# Patient Record
Sex: MALE | Race: WHITE | NOT HISPANIC OR LATINO | Employment: OTHER | URBAN - NONMETROPOLITAN AREA
[De-identification: names, ages, dates, MRNs, and addresses within clinical notes are randomized per-mention and may not be internally consistent; named-entity substitution may affect disease eponyms.]

---

## 2017-10-12 ENCOUNTER — HOSPITAL ENCOUNTER (EMERGENCY)
Facility: HOSPITAL | Age: 77
Discharge: HOME | End: 2017-10-12
Attending: EMERGENCY MEDICINE
Payer: MEDICARE

## 2017-10-12 ENCOUNTER — APPOINTMENT (EMERGENCY)
Dept: RADIOLOGY | Facility: HOSPITAL | Age: 77
End: 2017-10-12
Payer: MEDICARE

## 2017-10-12 VITALS
SYSTOLIC BLOOD PRESSURE: 112 MMHG | HEIGHT: 68 IN | WEIGHT: 226.85 LBS | BODY MASS INDEX: 34.38 KG/M2 | TEMPERATURE: 98.1 F | RESPIRATION RATE: 16 BRPM | OXYGEN SATURATION: 100 % | HEART RATE: 70 BPM | DIASTOLIC BLOOD PRESSURE: 70 MMHG

## 2017-10-12 DIAGNOSIS — L92.3 FOREIGN BODY REACTION OF THE SKIN: Primary | ICD-10-CM

## 2017-10-12 PROCEDURE — 99283 EMERGENCY DEPT VISIT LOW MDM: CPT

## 2017-10-12 PROCEDURE — 71020 HB CHEST X-RAY 2VW FRONTAL&LATL: CPT

## 2017-10-12 RX ORDER — AMLODIPINE BESYLATE 5 MG/1
5 TABLET ORAL EVERY EVENING
COMMUNITY

## 2017-10-12 RX ORDER — LISINOPRIL 20 MG/1
20 TABLET ORAL EVERY EVENING
COMMUNITY

## 2017-10-12 RX ORDER — TAMSULOSIN HYDROCHLORIDE 0.4 MG/1
0.4 CAPSULE ORAL
COMMUNITY

## 2017-10-12 NOTE — ED PROVIDER NOTES
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 who presents with a several month history of a fibrous band of tissue in the mid sternotomy incision region that began without any preceding trauma or injury and has been persistent since that time he first noticed it  He states the incision itself had healed well without any bridging tissue and this developed later  Denies any preceding trauma or injury to the area before the development of this fibrous tissue band  He has not tried to treat this area with anything for now  There is no bleeding/discharge from the area  Did mention this to his cardiologist when he visited last week and was referred to a cardiovascular surgeon for evaluation of this issue but declined travel there due to long distance and came to this emergency department instead  History provided by:  Patient      Prior to Admission Medications   Prescriptions Last Dose Informant Patient Reported? Taking? amLODIPine (NORVASC) 5 mg tablet 10/11/2017 at Unknown time  Yes Yes   Sig: Take 5 mg by mouth every evening   lisinopril (ZESTRIL) 20 mg tablet 10/11/2017 at Unknown time  Yes Yes   Sig: Take 20 mg by mouth every evening   tamsulosin (FLOMAX) 0 4 mg 10/11/2017 at Unknown time  Yes Yes   Sig: Take 0 4 mg by mouth daily with dinner      Facility-Administered Medications: None       Past Medical History:   Diagnosis Date    Cardiac disease     Enlarged prostate     Hyperlipidemia     Hypertension        Past Surgical History:   Procedure Laterality Date    CARDIAC SURGERY      CYST REMOVAL Left     breast       History reviewed  No pertinent family history    I have reviewed and agree with the history as documented  Social History   Substance Use Topics    Smoking status: Never Smoker    Smokeless tobacco: Former User     Types: Chew    Alcohol use Yes      Comment: rarely        Review of Systems   Constitutional: Negative for chills, diaphoresis, fatigue and fever  Respiratory: Negative for chest tightness and shortness of breath  Cardiovascular: Negative for chest pain and palpitations  Skin: Positive for wound  Negative for color change, pallor and rash  Hematological: Negative for adenopathy  Does not bruise/bleed easily  Physical Exam  ED Triage Vitals [10/12/17 1512]   Temperature Pulse Respirations Blood Pressure SpO2   98 1 °F (36 7 °C) 69 20 136/73 98 %      Temp Source Heart Rate Source Patient Position - Orthostatic VS BP Location FiO2 (%)   Temporal Monitor Sitting Right arm --      Pain Score       1           Physical Exam   Constitutional: He is oriented to person, place, and time  He appears well-developed and well-nourished  He is cooperative  No distress  HENT:   Head: Normocephalic and atraumatic  Right Ear: Hearing and external ear normal    Left Ear: Hearing and external ear normal    Nose: Nose normal    Eyes: Conjunctivae, EOM and lids are normal  Pupils are equal, round, and reactive to light  Neck: Trachea normal, normal range of motion and phonation normal  Neck supple  No JVD present  No tracheal tenderness, no spinous process tenderness and no muscular tenderness present  No tracheal deviation present  No thyroid mass and no thyromegaly present  Cardiovascular: Normal rate, regular rhythm, S1 normal, S2 normal and intact distal pulses  Exam reveals no gallop and no friction rub  Murmur heard  Crescendo systolic (Loudest at RUSB) murmur is present with a grade of 2/6   Pulses:       Radial pulses are 2+ on the right side, and 2+ on the left side  Dorsalis pedis pulses are 2+ on the right side, and 2+ on the left side  Posterior tibial pulses are 2+ on the right side, and 2+ on the left side  Pulmonary/Chest: Effort normal and breath sounds normal  No stridor  No respiratory distress  He has no decreased breath sounds  He has no wheezes  He has no rhonchi  He has no rales  He exhibits no tenderness, no edema, no deformity, no swelling and no retraction  Musculoskeletal: Normal range of motion  He exhibits no edema, tenderness or deformity  Lymphadenopathy:     He has no cervical adenopathy  Neurological: He is alert and oriented to person, place, and time  GCS eye subscore is 4  GCS verbal subscore is 5  GCS motor subscore is 6  Skin: Skin is warm, dry and intact  No rash noted  He is not diaphoretic  No erythema  Nursing note and vitals reviewed  ED Medications  Medications - No data to display    Diagnostic Studies  Labs Reviewed - No data to display    XR chest 2 views   Final Result      No active pulmonary disease           Workstation performed: [id number]             Procedures  Procedures      Phone Contacts  ED Phone Contact    ED Course  ED Course          MDM  Number of Diagnoses or Management Options  Foreign body reaction of the skin due to sternotomy wire: new and requires workup     Amount and/or Complexity of Data Reviewed  Tests in the radiology section of CPT®: ordered and reviewed  Decide to obtain previous medical records or to obtain history from someone other than the patient: yes  Review and summarize past medical records: yes  Independent visualization of images, tracings, or specimens: yes    Risk of Complications, Morbidity, and/or Mortality  Presenting problems: moderate  Diagnostic procedures: moderate  Management options: low  General comments: CXR appears to demonstrate small malpositioning of one end of third sternotomy wire from top of sternum possibly causing a foreign body reaction with scar tissue formation that accounts for the current appearance of patient's sternotomy incision  Regardless of exact etiology, there are no signs/symptoms of acute wound infection that would warrant antibiotic therapy at this time  There is no evidence of wound dehiscence or drainage from the wound either  Recommended patient to follow with his cardiac surgeon for a discussion regarding benefits of excision of affected scar tissue/sternotomy wire revision  All questions answered prior to discharge  The patient expressed understanding and agreed to plan  Patient Progress  Patient progress: stable    CritCare Time    Disposition  Final diagnoses:   Foreign body reaction of the skin due to sternotomy wire     ED Disposition     ED Disposition Condition Comment    Discharge  [address] discharge to home/self care  Condition at discharge: Good        Follow-up Information     Follow up With Specialties Details Why Contact Info    Dr Marc Carroll  Call in 1 day For an appointment for further evaluation [address], [place], [address] [address]  [phone]        Discharge Medication List as of 10/12/2017  4:24 PM      CONTINUE these medications which have NOT CHANGED    Details   amLODIPine (NORVASC) 5 mg tablet Take 5 mg by mouth every evening, Historical Med      lisinopril (ZESTRIL) 20 mg tablet Take 20 mg by mouth every evening, Historical Med      tamsulosin (FLOMAX) 0 4 mg Take 0 4 mg by mouth daily with dinner, Historical Med           No discharge procedures on file      ED Provider  Electronically Signed by       Josy Dickerson DO  10/13/17 1931

## 2017-10-12 NOTE — DISCHARGE INSTRUCTIONS
Soft Tissue Foreign Body   WHAT YOU NEED TO KNOW:   A foreign body may dissolve or come out of your skin without treatment  It may take weeks or months for this to happen  Your skin may feel stretched and sore after the foreign body is removed  This is normal and should get better within a few days  DISCHARGE INSTRUCTIONS:   Return to the emergency department if:   · Blood soaks through your bandage  · Your stitches come apart  · You see red streaks on the skin near your wound  · You have bleeding that does not stop after 10 minutes of holding firm, direct pressure over the wound  Contact your healthcare provider if:   · You have a fever  · Your wound is red, swollen, and draining pus  · Your symptoms, such as pain, do not get better or get worse  · You have questions or concerns about your condition or care  Medicines: You may  need any of the following:  · Antibiotics  help prevent a bacterial infection  · Acetaminophen  decreases pain and fever  It is available without a doctor's order  Ask how much to take and how often to take it  Follow directions  Acetaminophen can cause liver damage if not taken correctly  · NSAIDs , such as ibuprofen, help decrease swelling, pain, and fever  This medicine is available with or without a doctor's order  NSAIDs can cause stomach bleeding or kidney problems in certain people  If you take blood thinner medicine, always ask your healthcare provider if NSAIDs are safe for you  Always read the medicine label and follow directions  · Prescription pain medicine  may be given  Ask your healthcare provider how to take this medicine safely  Some prescription pain medicines contain acetaminophen  Do not take other medicines that contain acetaminophen without talking to your healthcare provider  Too much acetaminophen may cause liver damage  Prescription pain medicine may cause constipation   Ask your healthcare provider how to prevent or treat constipation  · Take your medicine as directed  Contact your healthcare provider if you think your medicine is not helping or if you have side effects  Tell him of her if you are allergic to any medicine  Keep a list of the medicines, vitamins, and herbs you take  Include the amounts, and when and why you take them  Bring the list or the pill bottles to follow-up visits  Carry your medicine list with you in case of an emergency  Elevate  the injured area above the level of your heart as often as you can  This will help decrease swelling and pain  Prop the injured area on pillows or blankets to keep it elevated comfortably  Apply ice  on your wound for 15 to 20 minutes every hour or as directed  Use an ice pack, or put crushed ice in a plastic bag  Cover it with a towel before you apply it to your skin  Ice helps prevent tissue damage and decreases swelling and pain  Care for your wound as directed: Your skin may feel stretched and sore after the foreign body is removed  This is normal and should get better within a few days  Keep your wound clean and dry  Do not get your wound wet  Do not change the bandage for 48 hours or as directed  You can change your bandages before 48 hours if they get wet or dirty  If your wound is packed, remove and change the packing as directed  Cover the area with a bandage as directed  Apply firm, steady pressure for 5 to 10 minutes if your wound bleeds  Use a clean gauze or towel to apply pressure  Bathing:  Ask your healthcare provider when your wound can get wet  When he says it is okay, carefully wash around the wound with soap and water  Let soap and water run over your wound  Do not scrub your wound  Dry the area and put on new, clean bandages as directed  Follow up with your healthcare provider as directed: You may need to return in 48 hours to have your wound checked for infection   You may need x-ray, ultrasound, or CT pictures to make sure all of the foreign body has been removed  Write down your questions so you remember to ask them during your visits  © 2017 [address] Information is for End User's use only and may not be sold, redistributed or otherwise used for commercial purposes  All illustrations and images included in CareNotes® are the copyrighted property of [employer]  or Anand [employer]  The above information is an  only  It is not intended as medical advice for individual conditions or treatments  Talk to your doctor, nurse or pharmacist before following any medical regimen to see if it is safe and effective for you

## 2017-11-29 ENCOUNTER — HOSPITAL ENCOUNTER (EMERGENCY)
Facility: HOSPITAL | Age: 77
Discharge: HOME | End: 2017-11-29
Attending: EMERGENCY MEDICINE | Admitting: EMERGENCY MEDICINE
Payer: MEDICARE

## 2017-11-29 ENCOUNTER — APPOINTMENT (EMERGENCY)
Dept: RADIOLOGY | Facility: HOSPITAL | Age: 77
End: 2017-11-29
Payer: MEDICARE

## 2017-11-29 VITALS
BODY MASS INDEX: 35.23 KG/M2 | HEART RATE: 70 BPM | RESPIRATION RATE: 17 BRPM | DIASTOLIC BLOOD PRESSURE: 73 MMHG | TEMPERATURE: 98.9 F | SYSTOLIC BLOOD PRESSURE: 133 MMHG | WEIGHT: 231.7 LBS | OXYGEN SATURATION: 97 %

## 2017-11-29 DIAGNOSIS — S43.402A SPRAIN OF LEFT SHOULDER: Primary | ICD-10-CM

## 2017-11-29 DIAGNOSIS — M75.22 BICIPITAL TENDONITIS OF LEFT SHOULDER: ICD-10-CM

## 2017-11-29 PROCEDURE — 99283 EMERGENCY DEPT VISIT LOW MDM: CPT

## 2017-11-29 PROCEDURE — 73030 X-RAY EXAM OF SHOULDER: CPT

## 2017-11-29 RX ORDER — NAPROXEN 500 MG/1
500 TABLET ORAL 2 TIMES DAILY WITH MEALS
Qty: 14 TABLET | Refills: 0 | Status: SHIPPED | OUTPATIENT
Start: 2017-11-29 | End: 2017-12-06

## 2017-11-29 RX ORDER — NAPROXEN 250 MG/1
500 TABLET ORAL ONCE
Status: COMPLETED | OUTPATIENT
Start: 2017-11-29 | End: 2017-11-29

## 2017-11-29 RX ADMIN — NAPROXEN 500 MG: 250 TABLET ORAL at 14:54

## 2017-11-29 NOTE — DISCHARGE INSTRUCTIONS
Shoulder Sprain   WHAT YOU NEED TO KNOW:   A shoulder sprain happens when a ligament in your shoulder is stretched or torn  Ligaments are the tough tissues that connect bones  Ligaments allow you to lift, lower, and rotate your arm  DISCHARGE INSTRUCTIONS:   Return to the emergency department if:   · You are short of breath  · Your throat feels tight, or you have trouble swallowing  · You feel sudden, sharp chest pain on the same side as your injury  · Your skin feels cold or clammy  Contact your healthcare provider if:   · The skin on your injured shoulder looks blue or pale  · You have new or increased swelling and pain in your shoulder  · You have new or increased stiffness when you move your injured shoulder  · You have questions or concerns about your condition or care  Medicines:   · Prescription pain medicine  may be given  Ask how to take this medicine safely  · Take your medicine as directed  Contact your healthcare provider if you think your medicine is not helping or if you have side effects  Tell him or her if you are allergic to any medicine  Keep a list of the medicines, vitamins, and herbs you take  Include the amounts, and when and why you take them  Bring the list or the pill bottles to follow-up visits  Carry your medicine list with you in case of an emergency  Follow up with your healthcare provider as directed:  Write down your questions so you remember to ask them during your visits  Self-care:   · Rest  your shoulder so it can heal  Avoid moving your shoulder as your injury heals  This will help decrease the risk of more damage to your shoulder  · Apply ice  on your shoulder for 15 to 20 minutes every hour or as directed  Use an ice pack, or put crushed ice in a plastic bag  Cover it with a towel  Ice helps prevent tissue damage and decreases swelling and pain  · Compress your shoulder as directed   Compression provides support and helps decrease swelling and movement so your shoulder can heal  For mild sprains, you may be given a sling to support your arm  You may need a padded brace or a plaster cast to hold your shoulder in place if the sprain is more serious  How to wear a brace, sling, or splint:  A brace, sling, or splint may be needed to limit your movement and protect your injured shoulder  · Wear your brace, sling, or splint as directed  You may need to wear it all the time and take it off only to bathe or do exercises  Ask your healthcare provider how long you should wear it  · Keep your skin clean and dry  Padding under your armpit will help absorb sweat and prevent sores on your skin  · Do not hunch your shoulders  This may cause pain  Keep your shoulders relaxed  · Position the sling over your arm and hand so that it also covers your knuckles  This will help the sling support your wrist and hand  Position your wrist higher than your elbow  Your wrist may start to hurt or go numb if your sling is too short  Exercise your shoulder:  After you rest your shoulder for 3 to 7 days, you will need to do light exercises to decrease shoulder stiffness  Check with your healthcare provider before you return to your normal activities or sports  Prevent another injury:  You can hurt your shoulder again if you stop treatment too soon  The following may decrease your risk for sprains:  · Do not exercise when you are tired or in pain  Warm up and stretch before you exercise  · Wear equipment to protect yourself when you play sports  · Wear shoes that fit well and run on flat surfaces to prevent falls  © 2017 [address] Information is for End User's use only and may not be sold, redistributed or otherwise used for commercial purposes  All illustrations and images included in CareNotes® are the copyrighted property of [employer]  or Anand Lopez  The above information is an  only   It is not intended as medical advice for individual conditions or treatments  Talk to your doctor, nurse or pharmacist before following any medical regimen to see if it is safe and effective for you  Tendinitis   WHAT YOU NEED TO KNOW:   Tendinitis is painful inflammation or breakdown of your tendons  It may also be called tendinopathy  Tendinitis often occurs in the knee, shoulder, ankle, hip, or elbow  DISCHARGE INSTRUCTIONS:   Medicines:   · Pain medicines  such as acetaminophen or NSAIDs may decrease swelling and pain or fever  These medicines are available without a doctor's order  Ask which medicine to take  Ask how much to take and when to take it  Follow directions  Acetaminophen and NSAIDs can cause liver or kidney damage if not taken correctly  If you take blood thinner medicine, always ask your healthcare provider if NSAIDs are safe for you  Always read the medicine label and follow the directions on it before using these medicine  · Take your medicine as directed  Contact your healthcare provider if you think your medicine is not helping or if you have side effects  Tell him if you are allergic to any medicine  Keep a list of the medicines, vitamins, and herbs you take  Include the amounts, and when and why you take them  Bring the list or the pill bottles to follow-up visits  Carry your medicine list with you in case of an emergency  Management:   · Rest  your tendon as directed to help it heal  Ask your healthcare provider if you need to stop putting weight on your affected area  · Ice  helps decrease swelling and pain  Ice may also help prevent tissue damage  Use an ice pack, or put crushed ice in a plastic bag  Cover it with a towel and place it on the affected area for 10 to 15 minutes every hour or as directed  · Support devices  such as a cane, splint, shoe insert, or brace may help reduce your pain  · Physical therapy  may be ordered by your healthcare provider   This may be used to teach you exercises to help improve movement and strength, and to decrease pain  You may also learn how to improve your posture, and how to lift or exercise correctly  Prevention:   · Stretch and warm up  before you exercise  · Exercise regularly  to strengthen the muscles around your joint  Ease into an exercise routine for the first 3 weeks to prevent another injury  Ask your healthcare provider about the best exercise plan for you  Rest fully between activities  · Use the right equipment  for sports and exercise  Wear braces or tape around weak joints as directed  Follow up with your healthcare provider as directed:  Write down your questions so you remember to ask them during your visits  Contact your healthcare provider if:   · You have increased pain even after you take medicine  · You have questions or concerns about your condition or care  Return to the emergency department if:   · You have increased redness over the joint, or swelling in the joint  · You suddenly cannot move your joint  © 2017 [address] Information is for End User's use only and may not be sold, redistributed or otherwise used for commercial purposes  All illustrations and images included in CareNotes® are the copyrighted property of [employer]  or Anand Lopez  The above information is an  only  It is not intended as medical advice for individual conditions or treatments  Talk to your doctor, nurse or pharmacist before following any medical regimen to see if it is safe and effective for you

## 2017-11-30 NOTE — ED PROVIDER NOTES
History  Chief Complaint   Patient presents with    Shoulder Injury     Outside brushing his dog, a [employer] truck drove by and dog went to go after the truck, the dog yanked left shoulder, has had pain since  Occurred yesterday     HPI     65 yoM presents with left shoulder injury  He was brushing his dog, holding the leash  Dog became startled and yanked his left shoulder  Having aching pain to the left anterior shoulder with some radiations up the neck and down the upper arm  No limited ROM except due to pain, especially with flexion and abduction  No paresthesias or weakness  No fall down or other injuries  Is right handed  MDM:  Imp: shoulder sprain vs dislocation or fx  X-rays, nsaids, ortho f/u  Prior to Admission Medications   Prescriptions Last Dose Informant Patient Reported? Taking? amLODIPine (NORVASC) 5 mg tablet   Yes No   Sig: Take 5 mg by mouth every evening   lisinopril (ZESTRIL) 20 mg tablet   Yes No   Sig: Take 20 mg by mouth every evening   tamsulosin (FLOMAX) 0 4 mg   Yes No   Sig: Take 0 4 mg by mouth daily with dinner      Facility-Administered Medications: None       Past Medical History:   Diagnosis Date    Cardiac disease     Enlarged prostate     Hyperlipidemia     Hypertension        Past Surgical History:   Procedure Laterality Date    CARDIAC PACEMAKER PLACEMENT      CARDIAC SURGERY      CABG x 5    CYST REMOVAL Left     breast       History reviewed  No pertinent family history  I have reviewed and agree with the history as documented  Social History   Substance Use Topics    Smoking status: Never Smoker    Smokeless tobacco: Former User     Types: Chew    Alcohol use Yes      Comment: rarely        Review of Systems   Constitutional: Negative  Negative for appetite change, diaphoresis, fatigue and fever     HENT: Negative for congestion, dental problem, drooling, ear discharge, ear pain, postnasal drip, rhinorrhea, sore throat, trouble swallowing and voice change  Eyes: Negative for photophobia, pain, discharge, redness and visual disturbance  Respiratory: Negative for cough, choking, chest tightness, shortness of breath, wheezing and stridor  Cardiovascular: Negative for chest pain, palpitations and leg swelling  Gastrointestinal: Negative for abdominal pain, blood in stool, constipation, diarrhea, nausea and vomiting  Endocrine: Negative  Genitourinary: Negative  Musculoskeletal:        Left shoulder pain   Skin: Negative  Allergic/Immunologic: Negative  Neurological: Negative  Hematological: Negative  Psychiatric/Behavioral: Negative  Physical Exam  ED Triage Vitals [11/29/17 1344]   Temperature Pulse Respirations Blood Pressure SpO2   98 9 °F (37 2 °C) 70 17 138/67 96 %      Temp Source Heart Rate Source Patient Position - Orthostatic VS BP Location FiO2 (%)   Temporal Monitor Sitting Right arm --      Pain Score       7           Orthostatic Vital Signs  Vitals:    11/29/17 1344 11/29/17 1400   BP: 138/67 133/73   Pulse: 70 70   Patient Position - Orthostatic VS: Sitting        Physical Exam   Constitutional: He is oriented to person, place, and time  He appears well-developed and well-nourished  No distress  HENT:   Head: Normocephalic and atraumatic  Nose: Nose normal    Mouth/Throat: Oropharynx is clear and moist    Eyes: Conjunctivae and EOM are normal  Pupils are equal, round, and reactive to light  Neck: Normal range of motion  Neck supple  No thyromegaly present  Cardiovascular: Normal rate, regular rhythm, normal heart sounds and intact distal pulses  Exam reveals no gallop and no friction rub  No murmur heard  Pulmonary/Chest: Effort normal and breath sounds normal  No stridor  No respiratory distress  He has no wheezes  He has no rales  He exhibits no tenderness  Abdominal: Soft  Bowel sounds are normal  There is no tenderness  There is no guarding  Musculoskeletal: Normal range of motion   He exhibits tenderness (left bicipital tendon discomfort to palp  Normal strength in all plains, but pain with active ROM in abduction  )  He exhibits no deformity  Neurological: He is alert and oriented to person, place, and time  He exhibits normal muscle tone  Skin: Skin is warm and dry  Psychiatric: He has a normal mood and affect  Vitals reviewed  ED Medications  Medications   naproxen (NAPROSYN) tablet 500 mg (500 mg Oral Given 11/29/17 1454)       Diagnostic Studies  Results Reviewed     None                 XR shoulder 2+ views LEFT   ED Interpretation by Case EDDIE Delgado DO (11/29 5219)   No acute osseus abnormality  Final Result by ROBERTO Ayala MD (11/29 6695)      No acute osseous abnormality  Cardiac pacemaker and stent  Workstation performed: [id number]                    Procedures  Procedures       Phone Contacts  ED Phone Contact    ED Course  ED Course                                MDM  CritCare Time    Disposition  Final diagnoses:   Sprain of left shoulder   Bicipital tendonitis of left shoulder     Time reflects when diagnosis was documented in both MDM as applicable and the Disposition within this note     Time User Action Codes Description Comment    11/29/2017  2:42 PM Danny, Case Add [A42 275P] Sprain of left shoulder     11/29/2017  2:42 PM Danny, Case Add [M75 [id number]] Bicipital tendonitis of left shoulder       ED Disposition     ED Disposition Condition Comment    Discharge  [address] discharge to home/self care  Condition at discharge: Good        Follow-up Information     Follow up With Specialties Details Why Contact Marciano Witt MD Orthopedic Surgery Schedule an appointment as soon as possible for a visit in 5 days for left shoulder sprain [phone] [place] [phone]          Discharge Medication List as of 11/29/2017  2:49 PM      START taking these medications    Details   naproxen (NAPROSYN) 500 mg tablet Take 1 tablet by mouth 2 (two) times a day with meals for 7 days, Starting Wed 11/29/2017, Until Wed 12/6/2017, Print         CONTINUE these medications which have NOT CHANGED    Details   amLODIPine (NORVASC) 5 mg tablet Take 5 mg by mouth every evening, Historical Med      lisinopril (ZESTRIL) 20 mg tablet Take 20 mg by mouth every evening, Historical Med      tamsulosin (FLOMAX) 0 4 mg Take 0 4 mg by mouth daily with dinner, Historical Med           No discharge procedures on file      ED Provider  Electronically Signed by           Jose F Fierro DO  11/30/17 7411

## 2017-12-05 ENCOUNTER — ALLSCRIPTS OFFICE VISIT (OUTPATIENT)
Dept: OTHER | Facility: OTHER | Age: 77
End: 2017-12-05

## 2017-12-05 ENCOUNTER — TRANSCRIBE ORDERS (OUTPATIENT)
Dept: ADMINISTRATIVE | Facility: HOSPITAL | Age: 77
End: 2017-12-05

## 2017-12-05 DIAGNOSIS — M75.42 IMPINGEMENT SYNDROME OF LEFT SHOULDER: ICD-10-CM

## 2017-12-05 DIAGNOSIS — M75.122 COMPLETE ROTATOR CUFF TEAR OF LEFT SHOULDER: ICD-10-CM

## 2017-12-05 DIAGNOSIS — M75.42 IMPINGEMENT SYNDROME OF LEFT SHOULDER: Primary | ICD-10-CM

## 2017-12-06 NOTE — PROGRESS NOTES
Assessment    1  Strain of tendon of left rotator cuff, initial encounter (840 4) (S46 012A)   2  Impingement syndrome of shoulder, left (726 2) (W12 42)    Plan  Impingement syndrome of shoulder, left    · Betamethasone Sod Phos & Acet 6 (3-3) MG/ML Injection Suspension   · *1 - [facility]  *  Status: Active  Requested for: 45WHK2411  Care Summary provided  : Yes   · Follow-up visit in 2 months Evaluation and Treatment  Follow-up  Status: Hold For -Scheduling  Requested for: 85ZNZ0282   · * CT SHOULDER LEFT ARTHROGRAM; Status:Need Information - FinancialAuthorization; Requested for:[id number];     Discussion/Summary    Left shoulder pain due to impingement and possible rotator cuff tear  Discussed treatment with the patient  Patient cannot get an MRI secondary to his pacemaker  Left subacromial space injected with steroid  Left biceps tendon up also injected with steroid  Patient will start a physical therapy program  Follow up in 6 weeks  If Patient is no better we will consider schedule him for an arthroscopy of the shoulder  Chief Complaint    1  Shoulder Problem  Left shoulder pain      History of Present Illness  69-year-old retired gentleman  Patient is right handed  Patient retired as an  and   Patient comes in with left shoulder pain for the last 2 weeks duration  initially injured his left shoulder while snow shoveling last winter  The pain and weakness disappear of its own accord  Two weeks ago was lifting a 40 pound box when he felt something give in his shoulder  He further re-injured her shoulder when his dog and dragged him while he is taking it out for a walk  Patient complains of pain in the left shoulder  Aggravated by overhead activities  Decreased with rest  Patient was seen and treated in the emergency room  Pain is present in the posterior lateral and anterior aspect of the shoulder  He has a sense of weakness in the shoulder   Patient has had bypass surgery with pacemaker in the past and patient is on regular Coumadin      Review of Systems   Constitutional: No fever or chills, feels well, no tiredness, no recent weight loss or weight gain  Eyes: No complaints of red eyes, no eyesight problems  ENT: no complaints of loss of hearing, no nosebleeds, no sore throat  Cardiovascular: No complaints of chest pain, no palpitations, no leg claudication or lower extremity edema  Respiratory: No complaints of shortness of breath, no wheezing, no cough  Gastrointestinal: No complaints of abdominal pain, no constipation, no nausea or vomiting, no diarrhea or bloody stools  Genitourinary: No complaints of dysuria or incontinence, no hesitancy, no nocturia  Musculoskeletal: as noted in HPI  Integumentary: No complaints of skin rash or lesion, no itching or dry skin, no skin wounds  Neurological: No complaints of headache, no confusion, no numbness or tingling, no dizziness  Psychiatric: No suicidal thoughts, no anxiety, no depression  Endocrine: frequent urination  Active Problems  1  Arthritis (716 90) (M19 90)   2  Heart disorder (429 9) (I51 9)   3  Hypertension (401 9) (I10)    Surgical History   · History of Heart Surgery   · History of Pacemaker Placement    Family History  Family History    · Paternal history of Black lung (500) (J60)   · Family history of Heart disease (429 9) (I51 9)   · Maternal history of Heart disease (429 9) (I51 9)    Current Meds   1  Lisinopril 20 MG Oral Tablet; TAKE 1 TABLET BY MOUTH ONCE DAILY Recorded   2  Norvasc 5 MG Oral Tablet (AmLODIPine Besylate); Take 1 tablet daily Recorded   3  Pravastatin Sodium 10 MG Oral Tablet; TAKE 1 TABLET AT BEDTIME Recorded   4  Tamsulosin HCl - 0 4 MG Oral Capsule Recorded    Allergies  1   No Known Drug Allergies    Vitals   Recorded: [id number] 02:11PM Recorded: 06HXI5023 01:59PM   Heart Rate 70    Systolic 479    Diastolic 81    Weight  985 lb        Physical Exam    ROM: Full except as noted: Forward flexion: restricted AROM 80 degrees  Abduction: restricted AROM 90 degrees  Motor: Normal except as noted: 3/5 external rotation,-- 4/5 forward flexion,-- 4/5 abduction,-- 5/5 extension-- and-- 5/5 internal rotation  Special Tests: Negative except positive Painful Arc  Left Shoulder Appearance[de-identified] Normal except  Tenderness: None except the greater tuberosity  Constitutional - General appearance: Normal   Musculoskeletal - Gait and station: Normal -- Digits and nails: Normal -- Muscle strength/tone: Normal   Cardiovascular - Pulses: Normal -- Examination of extremities for edema and/or varicosities: Normal   Skin - Skin and subcutaneous tissue: Normal   Neurologic - Sensation: Normal   Psychiatric - Orientation to person, place, and time: Normal -- Mood and affect: Normal   Eyes  Conjunctiva and lids: Normal    Pupils and irises: Normal        Procedure    Procedure: Injection of the left subacromial bursa  Indication:  inflammation-- and-- diagnostic  Risk, benefits and alternatives were discussed with the patient  Verbal consent was obtained prior to the procedure  Betadine was used to prep the area  ethyl chloride spray was used as a topical anesthetic  A 22-gauge was used to inject 4cc of 1% Lidocaine-- and-- 1mL of 4 mg/mL dexamethasone  A bandage was applied  the patient tolerated the procedure well  Complications: none        Signatures   Electronically signed by : CATALINA Talavera ; Dec  5 2017  2:28PM EST                       (Author)

## 2017-12-19 ENCOUNTER — GENERIC CONVERSION - ENCOUNTER (OUTPATIENT)
Dept: OTHER | Facility: OTHER | Age: 77
End: 2017-12-19

## 2017-12-19 ENCOUNTER — HOSPITAL ENCOUNTER (OUTPATIENT)
Dept: CT IMAGING | Facility: HOSPITAL | Age: 77
Discharge: HOME | End: 2017-12-19
Attending: ORTHOPAEDIC SURGERY
Payer: MEDICARE

## 2017-12-19 ENCOUNTER — HOSPITAL ENCOUNTER (OUTPATIENT)
Dept: RADIOLOGY | Facility: HOSPITAL | Age: 77
Discharge: HOME | End: 2017-12-19
Attending: ORTHOPAEDIC SURGERY
Payer: MEDICARE

## 2017-12-19 DIAGNOSIS — M75.42 IMPINGEMENT SYNDROME OF LEFT SHOULDER: ICD-10-CM

## 2017-12-19 PROCEDURE — 23350 INJECTION FOR SHOULDER X-RAY: CPT

## 2017-12-19 PROCEDURE — 73201 CT UPPER EXTREMITY W/DYE: CPT

## 2017-12-19 PROCEDURE — 77002 NEEDLE LOCALIZATION BY XRAY: CPT

## 2017-12-19 RX ORDER — LIDOCAINE WITH 8.4% SOD BICARB 0.9%(10ML)
2 SYRINGE (ML) INJECTION ONCE
Status: COMPLETED | OUTPATIENT
Start: 2017-12-19 | End: 2017-12-19

## 2017-12-19 RX ADMIN — IOHEXOL 14.5 ML: 240 INJECTION, SOLUTION INTRATHECAL; INTRAVASCULAR; INTRAVENOUS; ORAL at 10:25

## 2017-12-19 RX ADMIN — LIDOCAINE HYDROCHLORIDE 2 ML: 10 INJECTION, SOLUTION INFILTRATION; PERINEURAL at 10:25

## 2017-12-29 ENCOUNTER — GENERIC CONVERSION - ENCOUNTER (OUTPATIENT)
Dept: OTHER | Facility: OTHER | Age: 77
End: 2017-12-29

## 2018-01-23 VITALS
BODY MASS INDEX: 33.33 KG/M2 | DIASTOLIC BLOOD PRESSURE: 81 MMHG | HEIGHT: 69 IN | SYSTOLIC BLOOD PRESSURE: 159 MMHG | HEART RATE: 70 BPM | WEIGHT: 225 LBS

## 2018-01-24 VITALS
RESPIRATION RATE: 16 BRPM | HEART RATE: 71 BPM | WEIGHT: 235 LBS | SYSTOLIC BLOOD PRESSURE: 149 MMHG | HEIGHT: 69 IN | DIASTOLIC BLOOD PRESSURE: 72 MMHG | BODY MASS INDEX: 34.8 KG/M2

## 2018-02-14 ENCOUNTER — TELEPHONE (OUTPATIENT)
Dept: OBGYN CLINIC | Facility: CLINIC | Age: 78
End: 2018-02-14

## 2018-02-14 ENCOUNTER — ANESTHESIA EVENT (OUTPATIENT)
Dept: PERIOP | Facility: HOSPITAL | Age: 78
End: 2018-02-14

## 2018-02-14 NOTE — ANESTHESIA PREPROCEDURE EVALUATION
Review of Systems/Medical History  Patient summary reviewed  Chart reviewed      Cardiovascular  Pacemaker/AICD, Hyperlipidemia, Hypertension , Past MI , CAD, , History of CABG (16 yrs ago), DVT   Pulmonary       GI/Hepatic       Prostatic disorder, benign prostatic hyperplasia       Endo/Other    Obesity    GYN       Hematology   Musculoskeletal       Neurology   Psychology

## 2018-02-15 ENCOUNTER — ANESTHESIA (OUTPATIENT)
Dept: PERIOP | Facility: HOSPITAL | Age: 78
End: 2018-02-15

## 2019-09-03 ENCOUNTER — CONSULT (OUTPATIENT)
Dept: GASTROENTEROLOGY | Facility: HOSPITAL | Age: 79
End: 2019-09-03
Payer: MEDICARE

## 2019-09-03 ENCOUNTER — OFFICE VISIT (OUTPATIENT)
Dept: SURGERY | Facility: HOSPITAL | Age: 79
End: 2019-09-03
Payer: MEDICARE

## 2019-09-03 VITALS
HEART RATE: 69 BPM | BODY MASS INDEX: 33.33 KG/M2 | HEIGHT: 69 IN | DIASTOLIC BLOOD PRESSURE: 65 MMHG | SYSTOLIC BLOOD PRESSURE: 121 MMHG | TEMPERATURE: 98.1 F | WEIGHT: 225 LBS

## 2019-09-03 VITALS
BODY MASS INDEX: 33.33 KG/M2 | DIASTOLIC BLOOD PRESSURE: 65 MMHG | WEIGHT: 225 LBS | HEART RATE: 69 BPM | SYSTOLIC BLOOD PRESSURE: 121 MMHG | TEMPERATURE: 98.1 F | HEIGHT: 69 IN

## 2019-09-03 DIAGNOSIS — R14.0 BLOATING: Primary | ICD-10-CM

## 2019-09-03 DIAGNOSIS — K52.9 CHRONIC DIARRHEA: ICD-10-CM

## 2019-09-03 DIAGNOSIS — Z86.010 HISTORY OF COLON POLYPS: ICD-10-CM

## 2019-09-03 DIAGNOSIS — K62.5 RECTAL BLEEDING: ICD-10-CM

## 2019-09-03 PROBLEM — Z86.0100 HISTORY OF COLON POLYPS: Status: ACTIVE | Noted: 2019-09-03

## 2019-09-03 PROCEDURE — 99204 OFFICE O/P NEW MOD 45 MIN: CPT | Performed by: SURGERY

## 2019-09-03 PROCEDURE — 99204 OFFICE O/P NEW MOD 45 MIN: CPT | Performed by: INTERNAL MEDICINE

## 2019-09-03 RX ORDER — FUROSEMIDE 40 MG/1
TABLET ORAL
COMMUNITY
Start: 2019-04-29

## 2019-09-03 RX ORDER — ATORVASTATIN CALCIUM 20 MG/1
TABLET, FILM COATED ORAL
Refills: 3 | COMMUNITY
Start: 2019-08-11

## 2019-09-03 RX ORDER — SODIUM, POTASSIUM,MAG SULFATES 17.5-3.13G
180 SOLUTION, RECONSTITUTED, ORAL ORAL ONCE
Qty: 180 ML | Refills: 0 | Status: SHIPPED | OUTPATIENT
Start: 2019-09-03 | End: 2019-09-03

## 2019-09-03 NOTE — PATIENT INSTRUCTIONS
Scheduled patient with Dr Usman Buckley on 10/15/2019 @ [address]   Gave aj-  prep instructions Follow up with Lorin Palm

## 2019-09-03 NOTE — PROGRESS NOTES
[facility] - Outpatient Consultation  Bryson Browning Gmitter [de-identified] y o  male MRN: [id number]  Encounter: [id number]          ASSESSMENT AND PLAN:      1  Bloating  2  Chronic diarrhea  3  History of colon polyps  4  Rectal bleeding  I am concerned about this change in bowel habits along with his new onset of rectal bleeding and his bloating symptoms  I will schedule him for an upper endoscopy and colonoscopy to rule out peptic ulcer disease, Helicobacter pylori infection, celiac sprue, inflammatory bowel disease, and microscopic colitis  I encouraged him to eat a low FODMAP diet  I suspect his bleeding is due to hemorrhoids but want to rule other causes  - Colonoscopy; Future  - EGD; Future  - SUPREP BOWEL PREP KIT 17 5-3 13-1 6 GM/177ML SOLN; Take 180 mL by mouth once for 1 dose  Dispense: 180 mL; Refill: 0    ______________________________________________________________________    HPI:  He presents for evaluation because of bloating, diarrhea, and rectal bleeding over the past three years  He said the symptoms have been persistent and he has about five bowel movements per day  He sometimes has leakage of a small amount of blood which has been soiling his underwear  He has not had any reflux, difficulty swallowing, vomiting, or weight loss  He also denies any constipation symptoms  He denies any family history of colon polyps or colon cancer  His last upper endoscopy and colonoscopy were negative approximately three years ago aside from colon polyps  REVIEW OF SYSTEMS:    CONSTITUTIONAL: Denies any fever, chills, rigors, and weight loss, but complains of fatigue  HEENT: No earache or tinnitus  Denies hearing loss or visual disturbances  CARDIOVASCULAR: No chest pain or palpitations  RESPIRATORY: Denies any cough, hemoptysis, shortness of breath or dyspnea on exertion  GASTROINTESTINAL: As noted in the History of Present Illness  GENITOURINARY: No problems with urination  Denies any hematuria or dysuria  NEUROLOGIC: No dizziness or vertigo, denies headaches  MUSCULOSKELETAL: Denies any muscle or joint pain  SKIN: Denies skin rashes or itching  ENDOCRINE: Denies excessive thirst  Denies intolerance to heat or cold  PSYCHOSOCIAL: Denies depression or anxiety  Denies any recent memory loss  Historical Information   Past Medical History:   Diagnosis Date    Cardiac disease     DVT (deep venous thrombosis) (HCC)     Enlarged prostate     Hyperlipidemia     Hypertension     Myocardial infarction [facility])      Past Surgical History:   Procedure Laterality Date    CARDIAC PACEMAKER PLACEMENT      CARDIAC SURGERY      CABG x 5    CYST REMOVAL Left     breast     Social History   Social History     Substance and Sexual Activity   Alcohol Use Yes    Comment: rarely     Social History     Substance and Sexual Activity   Drug Use No     Social History     Tobacco Use   Smoking Status Never Smoker   Smokeless Tobacco Former User    Types: Chew     No family history on file  Meds/Allergies       Current Outpatient Medications:     amLODIPine (NORVASC) 5 mg tablet    atorvastatin (LIPITOR) 20 mg tablet    cholecalciferol (VITAMIN D3) 1,000 units tablet    cyanocobalamin (VITAMIN B-12) 100 mcg tablet    finasteride (PROSCAR) 5 mg tablet    furosemide (LASIX) 40 mg tablet    lisinopril (ZESTRIL) 20 mg tablet    naproxen (NAPROSYN) 500 mg tablet    pravastatin (PRAVACHOL) 10 mg tablet    SUPREP BOWEL PREP KIT 17 5-3 13-1 6 GM/177ML SOLN    tamsulosin (FLOMAX) 0 4 mg    vitamin E, tocopherol, 1,000 units capsule    warfarin (COUMADIN) 5 mg tablet    Warfarin Sodium (COUMADIN PO)    Allergies   Allergen Reactions    Ezetimibe Other (See Comments)     muscle ache    Simvastatin Other (See Comments)     statins = muscle ache           Objective     Blood pressure 121/65, pulse 69, temperature 98 1 °F (36 7 °C), height 5' 9" (1 753 m), weight 102 kg (225 lb)   Body mass index is 33 23 kg/m²  PHYSICAL EXAM:      General Appearance:   Alert, cooperative, no distress   HEENT:   Normocephalic, atraumatic, anicteric      Neck:  Supple, symmetrical, trachea midline   Lungs:   Clear to auscultation bilaterally; no rales, rhonchi or wheezing; respirations unlabored    Heart[de-identified]   Regular rate and rhythm; no murmur, rub, or gallop  Abdomen:   Soft, obese with diastasis recti, non-tender, non-distended; normal bowel sounds; no masses, no organomegaly    Genitalia:   Deferred    Rectal:   Deferred    Extremities:  No cyanosis, clubbing or edema    Pulses:  2+ and symmetric    Skin:  No jaundice, rashes, or lesions    Lymph nodes:  No palpable cervical lymphadenopathy        Lab Results:   No visits with results within 1 Day(s) from this visit  Latest known visit with results is:   No results found for any previous visit  Radiology Results:   No results found

## 2019-09-03 NOTE — LETTER
September 8, 2019     Florida Steel MD  [address] [facility] [address]    Patient: Geni Beckman   YOB: 1940   Date of Visit: 9/3/2019       Dear Dr Italia Mixon: Thank you for referring Fam Restrepo to me for evaluation  Below are my notes for this consultation  If you have questions, please do not hesitate to call me  I look forward to following your patient along with you  Sincerely,        Debora Patton DO        CC: No Recipients  Debora Patton DO  9/8/2019  2:09 PM  Sign at close encounter  Assessment/Plan:    Chronic diarrhea  Patient with chronic diarrhea for the past year to  Ultrasound from outside imaging G noted evidence of gallstones without any active acute cholecystitis or evidence of infection  Denies any abdominal pain  Just some bloating  Unlikely that his gallstones are his gallbladder is the cause of his current diarrhea  Patient is to see Gastroenterology later this afternoon I think this is important for him to keep that appointment  May be beneficial for him to undergo an colonoscopy to rule out any underlying microscopic colitis or inflammatory bowel disease  Bloating  Unclear etiology of this chronic bloatedness  Patient is passing flatus and a bowel in so there is no evidence of any obstruction  Does have chronic diarrhea  Recent ultrasound showing gallstones which he was referred to me  However I do not feel that the gallstones her causes bloating  Given the chronic bloated this diarrhea with again I think is pertinent he follow-up with his gastroenterologist   May benefit from another furthermore if his GI workup completely turns of negative he may benefit from a HIDA to rule out biliary dyskinesia as I have found that patients with a hypokinetic gallbladder tend to complain of bloating as a major symptom         Diagnoses and all orders for this visit:    Bloating    Chronic diarrhea    Other orders  -     furosemide (LASIX) 40 mg tablet; TAKE 1 TABLET BY MOUTH EVERY DAY  -     atorvastatin (LIPITOR) 20 mg tablet; TAKE 1 TABLET BY MOUTH EVERY DAY AT NIGHT          Subjective:      Patient ID: Farooq  is a [de-identified] y o  male  65-year-old gentleman who presents today for evaluation of chronic bloating and diarrhea  Patient also underwent ultrasound recently which showed evidence of gallstones but no acute evidence for acute cholecystitis  Patient states he has had some upper abdominal bloating and discomfort for the past few years  He also states that he has also had chronic diarrhea which comes and goes  Also noted have some occasional rectal bleeding which was likely secondary to hemorrhoids  Denies any severe abdominal pain  He also cannot pinpoint specific foods which may or may not be contributing to his bloatedness or diarrhea  Denies any fevers or chills  No chest pain shortness of breath  Did have colonoscopy years ago which showed states he was negative  The following portions of the patient's history were reviewed and updated as appropriate:   He  has a past medical history of Cardiac disease, DVT (deep venous thrombosis) ([facility] [address] ), Enlarged prostate, Hyperlipidemia, Hypertension, and Myocardial infarction ([facility] [address] )  He   Patient Active Problem List    Diagnosis Date Noted    Bloating 09/03/2019    Chronic diarrhea 09/03/2019    Rectal bleeding 09/03/2019    History of colon polyps 09/03/2019     He  has a past surgical history that includes Cyst Removal (Left); Cardiac surgery; and Cardiac pacemaker placement  His family history is not on file  He  reports that he has never smoked  He has quit using smokeless tobacco   His smokeless tobacco use included chew  He reports that he drinks alcohol  He reports that he does not use drugs    Current Outpatient Medications   Medication Sig Dispense Refill    amLODIPine (NORVASC) 5 mg tablet Take 5 mg by mouth every evening      atorvastatin (LIPITOR) 20 mg tablet TAKE 1 TABLET BY MOUTH EVERY DAY AT NIGHT  3    cholecalciferol (VITAMIN D3) 1,000 units tablet Take 1,000 Units by mouth daily      cyanocobalamin (VITAMIN B-12) 100 mcg tablet Take by mouth daily      finasteride (PROSCAR) 5 mg tablet Take 5 mg by mouth daily      furosemide (LASIX) 40 mg tablet TAKE 1 TABLET BY MOUTH EVERY DAY      lisinopril (ZESTRIL) 20 mg tablet Take 20 mg by mouth every evening      pravastatin (PRAVACHOL) 10 mg tablet Take 10 mg by mouth daily      tamsulosin (FLOMAX) 0 4 mg Take 0 4 mg by mouth daily with dinner      vitamin E, tocopherol, 1,000 units capsule Take 1,000 Units by mouth daily      warfarin (COUMADIN) 5 mg tablet Take 5 mg by mouth daily Tues thurs,sat      Warfarin Sodium (COUMADIN PO) Take 7 5 mg by mouth Mon,wed,fri,sun      naproxen (NAPROSYN) 500 mg tablet Take 1 tablet by mouth 2 (two) times a day with meals for 7 days 14 tablet 0    SUPREP BOWEL PREP KIT 17 5-3 13-1 6 GM/177ML SOLN Take 180 mL by mouth once for 1 dose 180 mL 0     No current facility-administered medications for this visit  He is allergic to ezetimibe and simvastatin       Review of Systems      A review of systems was conducted, all negative except as noted above HPI  Objective:      /65   Pulse 69   Temp 98 1 °F (36 7 °C)   Ht 5' 9" (1 753 m)   Wt 102 kg (225 lb)   BMI 33 23 kg/m²           Physical Exam   Constitutional: He is oriented to person, place, and time  He appears well-nourished  No distress  HENT:   Head: Normocephalic and atraumatic  Eyes: No scleral icterus  Neck: Normal range of motion  Neck supple  No tracheal deviation present  Cardiovascular: Normal rate, regular rhythm and normal heart sounds  Exam reveals no gallop and no friction rub  No murmur heard  Pulmonary/Chest: Effort normal and breath sounds normal  No stridor  No respiratory distress  He has no wheezes  He has no rales  He exhibits no tenderness  Abdominal: Soft  He exhibits no distension and no mass  There is no tenderness  There is no rebound and no guarding  No hernia  Noted diastasis recti   Musculoskeletal: Normal range of motion  He exhibits no edema, tenderness or deformity  Lymphadenopathy:     He has no cervical adenopathy  Neurological: He is alert and oriented to person, place, and time  No cranial nerve deficit  Skin: Skin is warm  No rash noted  He is not diaphoretic  No erythema  No pallor  Psychiatric: He has a normal mood and affect  His behavior is normal    Vitals reviewed

## 2019-09-08 NOTE — ASSESSMENT & PLAN NOTE
Patient with chronic diarrhea for the past year to  Ultrasound from outside imaging G noted evidence of gallstones without any active acute cholecystitis or evidence of infection  Denies any abdominal pain  Just some bloating  Unlikely that his gallstones are his gallbladder is the cause of his current diarrhea  Patient is to see Gastroenterology later this afternoon I think this is important for him to keep that appointment  May be beneficial for him to undergo an colonoscopy to rule out any underlying microscopic colitis or inflammatory bowel disease

## 2019-09-08 NOTE — ASSESSMENT & PLAN NOTE
Unclear etiology of this chronic bloatedness  Patient is passing flatus and a bowel in so there is no evidence of any obstruction  Does have chronic diarrhea  Recent ultrasound showing gallstones which he was referred to me  However I do not feel that the gallstones her causes bloating  Given the chronic bloated this diarrhea with again I think is pertinent he follow-up with his gastroenterologist   May benefit from another furthermore if his GI workup completely turns of negative he may benefit from a HIDA to rule out biliary dyskinesia as I have found that patients with a hypokinetic gallbladder tend to complain of bloating as a major symptom

## 2019-10-10 ENCOUNTER — TELEPHONE (OUTPATIENT)
Dept: GASTROENTEROLOGY | Facility: CLINIC | Age: 79
End: 2019-10-10

## 2019-10-10 NOTE — TELEPHONE ENCOUNTER
Spoke to  Eder Oconnor at [address] [place] [address] pt is able to hold coumadin starting today until his procedure on 10/15 I left message for patient to call us back to make him aware to hold, awaiting clearance on fax

## 2019-10-14 ENCOUNTER — ANESTHESIA EVENT (OUTPATIENT)
Dept: PERIOP | Facility: HOSPITAL | Age: 79
End: 2019-10-14

## 2019-10-15 ENCOUNTER — HOSPITAL ENCOUNTER (OUTPATIENT)
Dept: PERIOP | Facility: HOSPITAL | Age: 79
Setting detail: OUTPATIENT SURGERY
Discharge: HOME | End: 2019-10-15
Attending: INTERNAL MEDICINE
Payer: MEDICARE

## 2019-10-15 ENCOUNTER — ANESTHESIA (OUTPATIENT)
Dept: PERIOP | Facility: HOSPITAL | Age: 79
End: 2019-10-15

## 2019-10-15 VITALS
DIASTOLIC BLOOD PRESSURE: 76 MMHG | TEMPERATURE: 98.1 F | OXYGEN SATURATION: 96 % | HEIGHT: 69 IN | SYSTOLIC BLOOD PRESSURE: 131 MMHG | BODY MASS INDEX: 33.33 KG/M2 | RESPIRATION RATE: 18 BRPM | HEART RATE: 67 BPM | WEIGHT: 225 LBS

## 2019-10-15 DIAGNOSIS — K62.5 RECTAL BLEEDING: ICD-10-CM

## 2019-10-15 DIAGNOSIS — K52.9 CHRONIC DIARRHEA: ICD-10-CM

## 2019-10-15 DIAGNOSIS — R14.0 BLOATING: ICD-10-CM

## 2019-10-15 PROCEDURE — 88305 TISSUE EXAM BY PATHOLOGIST: CPT | Performed by: PATHOLOGY

## 2019-10-15 PROCEDURE — NC001 PR NO CHARGE: Performed by: INTERNAL MEDICINE

## 2019-10-15 PROCEDURE — 45380 COLONOSCOPY AND BIOPSY: CPT | Performed by: INTERNAL MEDICINE

## 2019-10-15 PROCEDURE — 43239 EGD BIOPSY SINGLE/MULTIPLE: CPT | Performed by: INTERNAL MEDICINE

## 2019-10-15 RX ORDER — PROPOFOL 10 MG/ML
INJECTION, EMULSION INTRAVENOUS CONTINUOUS PRN
Status: DISCONTINUED | OUTPATIENT
Start: 2019-10-15 | End: 2019-10-15 | Stop reason: SURG

## 2019-10-15 RX ORDER — SODIUM CHLORIDE, SODIUM LACTATE, POTASSIUM CHLORIDE, CALCIUM CHLORIDE 600; 310; 30; 20 MG/100ML; MG/100ML; MG/100ML; MG/100ML
75 INJECTION, SOLUTION INTRAVENOUS CONTINUOUS
Status: CANCELLED | OUTPATIENT
Start: 2019-10-15

## 2019-10-15 RX ORDER — LIDOCAINE HYDROCHLORIDE 10 MG/ML
INJECTION, SOLUTION EPIDURAL; INFILTRATION; INTRACAUDAL; PERINEURAL AS NEEDED
Status: DISCONTINUED | OUTPATIENT
Start: 2019-10-15 | End: 2019-10-15 | Stop reason: SURG

## 2019-10-15 RX ORDER — ONDANSETRON 2 MG/ML
4 INJECTION INTRAMUSCULAR; INTRAVENOUS ONCE AS NEEDED
Status: DISCONTINUED | OUTPATIENT
Start: 2019-10-15 | End: 2019-10-19 | Stop reason: HOSPADM

## 2019-10-15 RX ORDER — PROPOFOL 10 MG/ML
INJECTION, EMULSION INTRAVENOUS AS NEEDED
Status: DISCONTINUED | OUTPATIENT
Start: 2019-10-15 | End: 2019-10-15 | Stop reason: SURG

## 2019-10-15 RX ORDER — SODIUM CHLORIDE, SODIUM LACTATE, POTASSIUM CHLORIDE, CALCIUM CHLORIDE 600; 310; 30; 20 MG/100ML; MG/100ML; MG/100ML; MG/100ML
100 INJECTION, SOLUTION INTRAVENOUS CONTINUOUS
Status: DISCONTINUED | OUTPATIENT
Start: 2019-10-15 | End: 2019-10-19 | Stop reason: HOSPADM

## 2019-10-15 RX ADMIN — SODIUM CHLORIDE, SODIUM LACTATE, POTASSIUM CHLORIDE, AND CALCIUM CHLORIDE 100 ML/HR: .6; .31; .03; .02 INJECTION, SOLUTION INTRAVENOUS at 09:37

## 2019-10-15 RX ADMIN — LIDOCAINE HYDROCHLORIDE 100 MG: 10 INJECTION, SOLUTION EPIDURAL; INFILTRATION; INTRACAUDAL; PERINEURAL at 10:33

## 2019-10-15 RX ADMIN — PROPOFOL 100 MCG/KG/MIN: 10 INJECTION, EMULSION INTRAVENOUS at 10:33

## 2019-10-15 RX ADMIN — PROPOFOL 50 MG: 10 INJECTION, EMULSION INTRAVENOUS at 10:33

## 2019-10-15 NOTE — H&P
History and Physical - [facility]  Roxi Nur Gmitter [de-identified] y o  male MRN: [id number]                  HPI: Conrado Phillips is a [de-identified]y o  year old male who presents for bloating, chronic diarrhea, history of colon polyps, and rectal bleeding  REVIEW OF SYSTEMS: Per the HPI, and otherwise unremarkable  Historical Information   Past Medical History:   Diagnosis Date    Cardiac disease     Colon polyp     DVT (deep venous thrombosis) (HCC)     Enlarged prostate     Hyperlipidemia     Hypertension     Myocardial infarction [facility])      Past Surgical History:   Procedure Laterality Date    CARDIAC PACEMAKER PLACEMENT      CARDIAC SURGERY      CABG x 5    COLONOSCOPY      CYST REMOVAL Left     breast    EGD       Social History   Social History     Substance and Sexual Activity   Alcohol Use Yes    Comment: rarely     Social History     Substance and Sexual Activity   Drug Use No     Social History     Tobacco Use   Smoking Status Never Smoker   Smokeless Tobacco Current User    Types: Chew   Tobacco Comment    chews tobacco     History reviewed  No pertinent family history  Meds/Allergies       (Not in a hospital admission)    Allergies   Allergen Reactions    Ezetimibe Other (See Comments)     muscle ache    Simvastatin Other (See Comments)     statins = muscle ache       Objective     /73   Pulse 69   Temp (!) 97 °F (36 1 °C) (Tympanic)   Resp 18   Ht 5' 9" (1 753 m)   Wt 102 kg (225 lb)   SpO2 98%   BMI 33 23 kg/m²       PHYSICAL EXAM    Gen: NAD  CV: RRR  CHEST: Clear  ABD: soft, NT/ND  EXT: no edema      ASSESSMENT/PLAN:  This is a [de-identified]y o  year old male here for colonoscopy, and he is stable and optimized for his procedure

## 2019-10-15 NOTE — ANESTHESIA PREPROCEDURE EVALUATION
Review of Systems/Medical History  Patient summary reviewed  Chart reviewed  No history of anesthetic complications     Cardiovascular  Pacemaker/AICD (PM placed 2014), Hyperlipidemia, Hypertension , Past MI , CAD , History of CABG (2001), Cardiac stents (2011 RCA)  Dysrhythmias , atrial fibrillation, No angina ,   Comment: Neg nuclear stress test 2016  Reportedly mild AS on echo 2018,  Pulmonary  Sleep apnea: suspected but pt declined testing  ,        GI/Hepatic    Bowel prep       Prostatic disorder, benign prostatic hyperplasia       Endo/Other    Obesity    GYN       Hematology    Coagulation disorder (coumadin stopped 10/10) currently taking oral anticoagulants,    Musculoskeletal       Neurology   Psychology           Physical Exam    Airway    Mallampati score: II  TM Distance: >3 FB       Dental   upper dentures,     Cardiovascular      Pulmonary      Other Findings        Anesthesia Plan  ASA Score- 3     Anesthesia Type- IV sedation with anesthesia with ASA Monitors  Additional Monitors:   Airway Plan:         Plan Factors-    Induction- intravenous  Postoperative Plan-     Informed Consent- Anesthetic plan and risks discussed with patient  I personally reviewed this patient with the CRNA  Discussed and agreed on the Anesthesia Plan with the CRNA  Luis Fall

## 2019-10-16 ENCOUNTER — TELEPHONE (OUTPATIENT)
Dept: GASTROENTEROLOGY | Facility: AMBULARY SURGERY CENTER | Age: 79
End: 2019-10-16

## 2019-10-16 NOTE — TELEPHONE ENCOUNTER
Patients GI provider:  Dr Samson Gonzalez    Number to return call:  FAX # [phone]    Reason for call: Pt's pcp demond whitfield calling to get egd / colon  refaxed to them, some pages came across blank    Scheduled procedure/appointment date if applicable:  na

## 2019-10-16 NOTE — TELEPHONE ENCOUNTER
I called Dr Lyons [facility] office at [phone] to verify what information they need to be faxed because patient just had procedures yesterday 10/15/2019 so I want to make sure we are sending the proper information

## 2019-10-21 NOTE — RESULT ENCOUNTER NOTE
My medical assistant will call him with his results  The descending colon polyp was an adenoma so repeat colonoscopy in 5 years  The rest of the biopsies were negative

## 2019-10-31 ENCOUNTER — TELEPHONE (OUTPATIENT)
Dept: GASTROENTEROLOGY | Facility: CLINIC | Age: 79
End: 2019-10-31

## 2019-10-31 NOTE — TELEPHONE ENCOUNTER
----- Message from Cherelle Braswell PA-C sent at 10/25/2019 12:13 PM EDT -----  Called & RICCARDO Sharma

## 2019-11-01 RX ORDER — PRAVASTATIN SODIUM 10 MG
1 TABLET ORAL
COMMUNITY

## 2019-11-01 RX ORDER — NITROGLYCERIN 0.4 MG/1
0.4 TABLET SUBLINGUAL
COMMUNITY
Start: 2019-03-13

## 2019-11-01 RX ORDER — TRIAMCINOLONE ACETONIDE 1 MG/G
1 CREAM TOPICAL
COMMUNITY
Start: 2019-03-15

## 2019-11-01 RX ORDER — VITAMIN B COMPLEX
1000 TABLET ORAL
COMMUNITY

## 2019-11-01 RX ORDER — LANOLIN ALCOHOL/MO/W.PET/CERES
400 CREAM (GRAM) TOPICAL DAILY
COMMUNITY
Start: 2012-05-24

## 2019-11-01 RX ORDER — ASCORBIC ACID 500 MG
500 TABLET ORAL DAILY
COMMUNITY
Start: 2012-05-24

## 2019-11-01 RX ORDER — MULTIVIT WITH MINERALS/LUTEIN
1000 TABLET ORAL
COMMUNITY

## 2021-08-30 NOTE — PROGRESS NOTES
Assessment/Plan:    Chronic diarrhea  Patient with chronic diarrhea for the past year to  Ultrasound from outside imaging G noted evidence of gallstones without any active acute cholecystitis or evidence of infection  Denies any abdominal pain  Just some bloating  Unlikely that his gallstones are his gallbladder is the cause of his current diarrhea  Patient is to see Gastroenterology later this afternoon I think this is important for him to keep that appointment  May be beneficial for him to undergo an colonoscopy to rule out any underlying microscopic colitis or inflammatory bowel disease  Bloating  Unclear etiology of this chronic bloatedness  Patient is passing flatus and a bowel in so there is no evidence of any obstruction  Does have chronic diarrhea  Recent ultrasound showing gallstones which he was referred to me  However I do not feel that the gallstones her causes bloating  Given the chronic bloated this diarrhea with again I think is pertinent he follow-up with his gastroenterologist   May benefit from another furthermore if his GI workup completely turns of negative he may benefit from a HIDA to rule out biliary dyskinesia as I have found that patients with a hypokinetic gallbladder tend to complain of bloating as a major symptom  Diagnoses and all orders for this visit:    Bloating    Chronic diarrhea    Other orders  -     furosemide (LASIX) 40 mg tablet; TAKE 1 TABLET BY MOUTH EVERY DAY  -     atorvastatin (LIPITOR) 20 mg tablet; TAKE 1 TABLET BY MOUTH EVERY DAY AT NIGHT          Subjective:      Patient ID: Ailyn Hancock is a [de-identified] y o  male  43-year-old gentleman who presents today for evaluation of chronic bloating and diarrhea  Patient also underwent ultrasound recently which showed evidence of gallstones but no acute evidence for acute cholecystitis  Patient states he has had some upper abdominal bloating and discomfort for the past few years    He also states that he has Writer reviewed discharge instructions with patient and wife including follow-up, signs/symptoms of infection, drain care, ostomy care, restrictions, diet, medications and wound care. Patient verbalized understanding. Patient to be discharged to home with home care. He will  prescriptions in the outpatient pharmacy. Wound/ostomy supplies sent with patient.   also had chronic diarrhea which comes and goes  Also noted have some occasional rectal bleeding which was likely secondary to hemorrhoids  Denies any severe abdominal pain  He also cannot pinpoint specific foods which may or may not be contributing to his bloatedness or diarrhea  Denies any fevers or chills  No chest pain shortness of breath  Did have colonoscopy years ago which showed states he was negative  The following portions of the patient's history were reviewed and updated as appropriate:   He  has a past medical history of Cardiac disease, DVT (deep venous thrombosis) ([facility] [address] ), Enlarged prostate, Hyperlipidemia, Hypertension, and Myocardial infarction ([facility] )  He   Patient Active Problem List    Diagnosis Date Noted    Bloating 09/03/2019    Chronic diarrhea 09/03/2019    Rectal bleeding 09/03/2019    History of colon polyps 09/03/2019     He  has a past surgical history that includes Cyst Removal (Left); Cardiac surgery; and Cardiac pacemaker placement  His family history is not on file  He  reports that he has never smoked  He has quit using smokeless tobacco   His smokeless tobacco use included chew  He reports that he drinks alcohol  He reports that he does not use drugs    Current Outpatient Medications   Medication Sig Dispense Refill    amLODIPine (NORVASC) 5 mg tablet Take 5 mg by mouth every evening      atorvastatin (LIPITOR) 20 mg tablet TAKE 1 TABLET BY MOUTH EVERY DAY AT NIGHT  3    cholecalciferol (VITAMIN D3) 1,000 units tablet Take 1,000 Units by mouth daily      cyanocobalamin (VITAMIN B-12) 100 mcg tablet Take by mouth daily      finasteride (PROSCAR) 5 mg tablet Take 5 mg by mouth daily      furosemide (LASIX) 40 mg tablet TAKE 1 TABLET BY MOUTH EVERY DAY      lisinopril (ZESTRIL) 20 mg tablet Take 20 mg by mouth every evening      pravastatin (PRAVACHOL) 10 mg tablet Take 10 mg by mouth daily      tamsulosin (FLOMAX) 0 4 mg Take 0 4 mg by mouth daily with dinner  vitamin E, tocopherol, 1,000 units capsule Take 1,000 Units by mouth daily      warfarin (COUMADIN) 5 mg tablet Take 5 mg by mouth daily Tues thurs,sat      Warfarin Sodium (COUMADIN PO) Take 7 5 mg by mouth Mon,wed,fri,sun      naproxen (NAPROSYN) 500 mg tablet Take 1 tablet by mouth 2 (two) times a day with meals for 7 days 14 tablet 0    SUPREP BOWEL PREP KIT 17 5-3 13-1 6 GM/177ML SOLN Take 180 mL by mouth once for 1 dose 180 mL 0     No current facility-administered medications for this visit  He is allergic to ezetimibe and simvastatin       Review of Systems      A review of systems was conducted, all negative except as noted above HPI  Objective:      /65   Pulse 69   Temp 98 1 °F (36 7 °C)   Ht 5' 9" (1 753 m)   Wt 102 kg (225 lb)   BMI 33 23 kg/m²          Physical Exam   Constitutional: He is oriented to person, place, and time  He appears well-nourished  No distress  HENT:   Head: Normocephalic and atraumatic  Eyes: No scleral icterus  Neck: Normal range of motion  Neck supple  No tracheal deviation present  Cardiovascular: Normal rate, regular rhythm and normal heart sounds  Exam reveals no gallop and no friction rub  No murmur heard  Pulmonary/Chest: Effort normal and breath sounds normal  No stridor  No respiratory distress  He has no wheezes  He has no rales  He exhibits no tenderness  Abdominal: Soft  He exhibits no distension and no mass  There is no tenderness  There is no rebound and no guarding  No hernia  Noted diastasis recti   Musculoskeletal: Normal range of motion  He exhibits no edema, tenderness or deformity  Lymphadenopathy:     He has no cervical adenopathy  Neurological: He is alert and oriented to person, place, and time  No cranial nerve deficit  Skin: Skin is warm  No rash noted  He is not diaphoretic  No erythema  No pallor  Psychiatric: He has a normal mood and affect  His behavior is normal    Vitals reviewed

## 2022-05-05 ENCOUNTER — APPOINTMENT (OUTPATIENT)
Dept: RADIOLOGY | Facility: MEDICAL CENTER | Age: 82
End: 2022-05-05
Payer: MEDICARE

## 2022-05-05 ENCOUNTER — OFFICE VISIT (OUTPATIENT)
Dept: URGENT CARE | Facility: MEDICAL CENTER | Age: 82
End: 2022-05-05
Payer: MEDICARE

## 2022-05-05 VITALS — RESPIRATION RATE: 18 BRPM | TEMPERATURE: 98.9 F | HEART RATE: 80 BPM | OXYGEN SATURATION: 97 %

## 2022-05-05 DIAGNOSIS — J06.9 ACUTE RESPIRATORY DISEASE: Primary | ICD-10-CM

## 2022-05-05 DIAGNOSIS — R05.1 ACUTE COUGH: ICD-10-CM

## 2022-05-05 PROCEDURE — G0463 HOSPITAL OUTPT CLINIC VISIT: HCPCS | Performed by: PHYSICIAN ASSISTANT

## 2022-05-05 PROCEDURE — 71046 X-RAY EXAM CHEST 2 VIEWS: CPT

## 2022-05-05 PROCEDURE — 99214 OFFICE O/P EST MOD 30 MIN: CPT | Performed by: PHYSICIAN ASSISTANT

## 2022-05-05 RX ORDER — BENZONATATE 100 MG/1
100 CAPSULE ORAL 3 TIMES DAILY PRN
Qty: 20 CAPSULE | Refills: 0 | Status: SHIPPED | OUTPATIENT
Start: 2022-05-05

## 2022-05-05 NOTE — PROGRESS NOTES
[facility]        NAME: Shilpa Reese is a 79 y o  male  : 1940    MRN: [id number]  DATE: May 5, 2022  TIME: 12:19 PM    Assessment and Plan   Acute respiratory disease [J06 9]  1  Acute respiratory disease     2  Acute cough  XR chest pa & lateral    benzonatate (TESSALON PERLES) 100 mg capsule       Declined COVID testing  CXR provider read: enlarged cardiac silhouette  No acute cardiopulmonary disease  Patient Instructions     Take Tessalon as prescribed  Take over the counter Mucinex during the day  Fluids and rest (Warm water with honey and lemon)  Follow up with PCP in 3-5 days  Proceed to  ER if symptoms worsen  Chief Complaint     Chief Complaint   Patient presents with    Cough         History of Present Illness       Cough  This is a new problem  Episode onset: few days  The cough is productive of sputum  Associated symptoms include rhinorrhea  Pertinent negatives include no chest pain, chills, ear pain, fever, headaches, myalgias, postnasal drip, rash, sore throat, shortness of breath or wheezing  Treatments tried: OTC cold and flu  There is no history of asthma, COPD or pneumonia  Review of Systems   Review of Systems   Constitutional: Negative for chills and fever  HENT: Positive for congestion and rhinorrhea  Negative for dental problem, ear discharge, ear pain, facial swelling, postnasal drip, sinus pressure, sinus pain, sneezing, sore throat and trouble swallowing  Eyes: Negative for itching  Respiratory: Positive for cough (productive)  Negative for chest tightness, shortness of breath and wheezing  Cardiovascular: Negative for chest pain and palpitations  Gastrointestinal: Negative for abdominal pain, constipation, diarrhea, nausea and vomiting  Musculoskeletal: Negative for myalgias  Skin: Negative for rash  Neurological: Negative for dizziness, weakness, light-headedness and headaches           Current Medications       Current Outpatient Medications:     ascorbic acid (VITAMIN C) 500 MG tablet, Take 500 mg by mouth daily, Disp: , Rfl:     atorvastatin (LIPITOR) 20 mg tablet, TAKE 1 TABLET BY MOUTH EVERY DAY AT NIGHT, Disp: , Rfl: 3    cholecalciferol (VITAMIN D3) 1,000 units tablet, Take 1,000 Units by mouth daily, Disp: , Rfl:     cyanocobalamin (VITAMIN B-12) 500 MCG tablet, Take 1 tablet by mouth daily, Disp: , Rfl:     finasteride (PROSCAR) 5 mg tablet, Take 5 mg by mouth daily, Disp: , Rfl:     folic acid (FOLVITE) 059 mcg tablet, Take 400 mcg by mouth daily, Disp: , Rfl:     furosemide (LASIX) 40 mg tablet, TAKE 1 TABLET BY MOUTH EVERY DAY, Disp: , Rfl:     lisinopril (ZESTRIL) 20 mg tablet, Take 20 mg by mouth every evening, Disp: , Rfl:     Multiple Vitamins-Minerals (MULTIVITAMINS/MINERALS ADULT PO), daily, Disp: , Rfl:     nitroglycerin (NITROSTAT) 0 4 mg SL tablet, Place 0 4 mg under the tongue, Disp: , Rfl:     polyethylene glycol-propylene glycol (HM LUBRICATING TEARS) 0 4-0 3 %, 1 drop, Disp: , Rfl:     tamsulosin (FLOMAX) 0 4 mg, Take 0 4 mg by mouth daily with dinner, Disp: , Rfl:     amLODIPine (NORVASC) 5 mg tablet, Take 5 mg by mouth every evening (Patient not taking: Reported on 5/5/2022 ), Disp: , Rfl:     benzonatate (TESSALON PERLES) 100 mg capsule, Take 1 capsule (100 mg total) by mouth 3 (three) times a day as needed for cough, Disp: 20 capsule, Rfl: 0    cholecalciferol (VITAMIN D3) 25 mcg (1,000 units) tablet, Take 1,000 Units by mouth, Disp: , Rfl:     cyanocobalamin (VITAMIN B-12) 100 mcg tablet, Take by mouth daily, Disp: , Rfl:     naproxen (NAPROSYN) 500 mg tablet, Take 1 tablet by mouth 2 (two) times a day with meals for 7 days, Disp: 14 tablet, Rfl: 0    pravastatin (PRAVACHOL) 10 mg tablet, Take 1 tablet by mouth, Disp: , Rfl:     SUPREP BOWEL PREP KIT 17 5-3 13-1 6 GM/177ML SOLN, Take 180 mL by mouth once for 1 dose, Disp: 180 mL, Rfl: 0    TOBRADEX ophthalmic ointment, APPLY SMALL AMOUNT 3 TIMES EVERY DAY INTO THE CONJUNCTIVAL SAC(S) IN AFFECTED EYE(S), Disp: , Rfl: 1    triamcinolone (KENALOG) 0 1 % cream, Apply 1 application topically, Disp: , Rfl:     vitamin E, tocopherol, 1,000 units capsule, Take 1,000 Units by mouth daily, Disp: , Rfl:     vitamin E, tocopherol, 1,000 units capsule, Take 1,000 Units by mouth, Disp: , Rfl:     warfarin (COUMADIN) 5 mg tablet, Take 5 mg by mouth daily Tues thurs,sat (Patient not taking: Reported on 5/5/2022 ), Disp: , Rfl:     Warfarin Sodium (COUMADIN PO), Take 7 5 mg by mouth Mon,wed,fri,sun (Patient not taking: Reported on 5/5/2022 ), Disp: , Rfl:     Current Allergies     Allergies as of 05/05/2022 - Reviewed 05/05/2022   Allergen Reaction Noted    Ezetimibe Other (See Comments) 09/03/2019    Simvastatin Other (See Comments) 09/03/2019            The following portions of the patient's history were reviewed and updated as appropriate: allergies, current medications, past family history, past medical history, past social history, past surgical history and problem list      Past Medical History:   Diagnosis Date    Cardiac disease     Colon polyp     DVT [facility]deep [facility] thrombosis) ([facility] )     Enlarged prostate     Hyperlipidemia     Hypertension     Myocardial infarction [facility])        Past Surgical History:   Procedure Laterality Date    CARDIAC PACEMAKER PLACEMENT      CARDIAC SURGERY      CABG x 5    COLONOSCOPY      CYST REMOVAL Left     breast    EGD         No family history on file  Medications have been verified  Objective   Pulse 80   Temp 98 9 °F (37 2 °C)   Resp 18   SpO2 97%   No LMP for male patient  Physical Exam     Physical Exam  Vitals reviewed  Constitutional:       General: He is not in acute distress  Appearance: He is well-developed  He is not diaphoretic  HENT:      Head: Normocephalic        Right Ear: Tympanic membrane and external ear normal       Left Ear: Tympanic membrane and external ear normal       Nose: Nose normal       Mouth/Throat:      Pharynx: No oropharyngeal exudate or posterior oropharyngeal erythema  Eyes:      Conjunctiva/sclera: Conjunctivae normal    Cardiovascular:      Rate and Rhythm: Normal rate and regular rhythm  Heart sounds: Murmur (systolic murmur best heard R 2nd intercostal space) heard  No friction rub  No gallop  Pulmonary:      Effort: Pulmonary effort is normal  No respiratory distress  Breath sounds: Normal breath sounds  No wheezing or rales  Chest:      Chest wall: No tenderness  Lymphadenopathy:      Cervical: No cervical adenopathy  Skin:     General: Skin is warm  Neurological:      Mental Status: He is alert and oriented to person, place, and time  Psychiatric:         Behavior: Behavior normal          Thought Content:  Thought content normal          Judgment: Judgment normal

## 2022-05-05 NOTE — PATIENT INSTRUCTIONS
Take Tessalon as prescribed  Take over the counter Mucinex during the day  Fluids and rest (Warm water with honey and lemon)  Follow up with PCP in 3-5 days  Proceed to  ER if symptoms worsen  Cold Symptoms   WHAT YOU NEED TO KNOW:   A cold is an infection caused by a virus  The infection causes your upper respiratory system to become inflamed  Common symptoms of a cold include sneezing, dry throat, a stuffy nose, headache, watery eyes, and a cough  Your cough may be dry, or you may cough up mucus  You may also have muscle aches, joint pain, and tiredness  Rarely, you may have a fever  Most colds go away without treatment  DISCHARGE INSTRUCTIONS:   Return to the emergency department if:   · You have increased tiredness and weakness  · You are unable to eat  · Your heart is beating much faster than usual for you  · You see white spots in the back of your throat and your neck is swollen and sore to the touch  · You see pinpoint or larger reddish-purple dots on your skin  Contact your healthcare provider if:   · You have a fever higher than 102°F (38 9°C)  · You have new or worsening shortness of breath  · You have thick nasal drainage for more than 2 days  · Your symptoms do not improve or get worse within 5 days  · You have questions or concerns about your condition or care  Medicines: The following medicines may be suggested by your healthcare provider to decrease your cold symptoms  These medicines are available without a doctor's order  Ask which medicines to take and when to take them  Follow directions  · NSAIDs or acetaminophen  help to bring down a fever or decrease pain  · Decongestants  help decrease nasal stuffiness  · Antihistamines  help decrease sneezing and a runny nose  · Cough suppressants  help decrease how much you cough  · Expectorants  help loosen mucus so you can cough it up  · Take your medicine as directed    Contact your healthcare provider if you think your medicine is not helping or if you have side effects  Tell him of her if you are allergic to any medicine  Keep a list of the medicines, vitamins, and herbs you take  Include the amounts, and when and why you take them  Bring the list or the pill bottles to follow-up visits  Carry your medicine list with you in case of an emergency  Symptom relief: The following may help relieve cold symptoms, such as a dry throat and congestion:  · Gargle with mouthwash or warm salt water as directed  · Suck on throat lozenges or hard candy  · Use a cold or warm vaporizer or humidifier to ease your breathing  · Rest for at least 2 days and then as needed to decrease tiredness and weakness  · Use petroleum based jelly around your nostrils to decrease irritation from blowing your nose  Drink liquids:  Liquids will help thin and loosen thick mucus so you can cough it up  Liquids will also keep you hydrated  Ask your healthcare provider which liquids are best for you and how much to drink each day  Prevent the spread of germs: You can spread your cold germs to others for at least 3 days after your symptoms start  Wash your hands often  Do not share items, such as eating utensils  Cover your nose and mouth when you cough or sneeze using the crook of your elbow instead of your hands  Throw used tissues in the garbage  Do not smoke:  Smoking may worsen your symptoms and increase the length of time you feel sick  Talk with your healthcare provider if you need help to stop smoking  Follow up with your doctor as directed:  Write down your questions so you remember to ask them during your visits  © Copyright [employer] 2022 Information is for End User's use only and may not be sold, redistributed or otherwise used for commercial purposes   All illustrations and images included in CareNotes® are the copyrighted property of A [employer] Inc  or [employer]  The above information is an  only  It is not intended as medical advice for individual conditions or treatments  Talk to your doctor, nurse or pharmacist before following any medical regimen to see if it is safe and effective for you

## 2022-05-11 ENCOUNTER — OFFICE VISIT (OUTPATIENT)
Dept: URGENT CARE | Facility: MEDICAL CENTER | Age: 82
End: 2022-05-11
Payer: MEDICARE

## 2022-05-11 ENCOUNTER — APPOINTMENT (OUTPATIENT)
Dept: RADIOLOGY | Facility: MEDICAL CENTER | Age: 82
End: 2022-05-11
Payer: MEDICARE

## 2022-05-11 VITALS
HEIGHT: 67 IN | BODY MASS INDEX: 35.31 KG/M2 | OXYGEN SATURATION: 96 % | RESPIRATION RATE: 18 BRPM | WEIGHT: 225 LBS | DIASTOLIC BLOOD PRESSURE: 78 MMHG | HEART RATE: 69 BPM | SYSTOLIC BLOOD PRESSURE: 136 MMHG | TEMPERATURE: 98 F
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
## 2022-05-11 DIAGNOSIS — S43.402A SPRAIN OF LEFT SHOULDER, UNSPECIFIED SHOULDER SPRAIN TYPE, INITIAL ENCOUNTER: Primary | ICD-10-CM

## 2022-05-11 DIAGNOSIS — R05.9 COUGH: ICD-10-CM

## 2022-05-11 PROCEDURE — 73030 X-RAY EXAM OF SHOULDER: CPT

## 2022-05-11 PROCEDURE — 99213 OFFICE O/P EST LOW 20 MIN: CPT | Performed by: PHYSICIAN ASSISTANT

## 2022-05-11 PROCEDURE — G0463 HOSPITAL OUTPT CLINIC VISIT: HCPCS | Performed by: PHYSICIAN ASSISTANT

## 2022-05-11 NOTE — PROGRESS NOTES
[facility]        NAME: Elena Pérez is a 79 y o  male  : 1940    MRN: [id number]  DATE: May 11, 2022  TIME: 2:58 PM    Assessment and Plan   Sprain of left shoulder, unspecified shoulder sprain type, initial encounter [S43 [id number]]  1  Sprain of left shoulder, unspecified shoulder sprain type, initial encounter  XR shoulder 2+ vw left    Ambulatory referral to Orthopedic Surgery   2  Cough       Lung exam normal without rales  Denies new/worsening SOB, chest discomfort or fevers  CXR on 2022 without acute cardiopulmonary disease  States cough is improving overall  Provider read L shoulder XR: no acute fracture or dislocation  Degenerative changes noted  Patient Instructions     Cough may linger for 4-6 weeks  Monitor for new/worsening shortness of breath, fevers, worsening cough or new chest discomfort  Tylenol as needed for pain  Do not immobilize shoulder  Ice over affected area 15 minutes at least 4 times per day  Insulate area to prevent frostbite  Follow up with ortho specialist  Follow up with PCP in 3-5 days  Proceed to  ER if symptoms worsen  Chief Complaint     Chief Complaint   Patient presents with    Nasal Congestion     Pt  Treated for cough and congestion last week, given tessalong amanda which made his cough worse but also states " the cough went away" and he not has chest congestion, denies sore throat, occasional headache         History of Present Illness       Patient states he feels cloudy but admits to taking a benadryl this morning  States he hears a sounds "that's not wheezing, I can't describe it " when he lays down  Additionally reports he fell onto his L shoulder 2 days ago in the house  He hasn't been able to move his L arm since the event  Patient has been applying topical pain relief cream to the shoulder  Cough  This is a new problem  The current episode started 1 to 4 weeks ago (3 weeks)  The problem has been gradually improving   The cough is non-productive  Pertinent negatives include no chills, fever, headaches or shortness of breath  Treatments tried: tessalon-"make my cough worse"; antihistamine, over the counter cough medication  Review of Systems   Review of Systems   Constitutional: Negative for chills and fever  HENT: Negative  Respiratory: Positive for cough  Negative for shortness of breath  Musculoskeletal: Negative for joint swelling  Neurological: Negative for numbness and headaches           Current Medications       Current Outpatient Medications:     amLODIPine (NORVASC) 5 mg tablet, Take 5 mg by mouth every evening (Patient not taking: Reported on 5/5/2022 ), Disp: , Rfl:     ascorbic acid (VITAMIN C) 500 MG tablet, Take 500 mg by mouth daily, Disp: , Rfl:     atorvastatin (LIPITOR) 20 mg tablet, TAKE 1 TABLET BY MOUTH EVERY DAY AT NIGHT, Disp: , Rfl: 3    benzonatate (TESSALON PERLES) 100 mg capsule, Take 1 capsule (100 mg total) by mouth 3 (three) times a day as needed for cough, Disp: 20 capsule, Rfl: 0    cholecalciferol (VITAMIN D3) 1,000 units tablet, Take 1,000 Units by mouth daily, Disp: , Rfl:     cholecalciferol (VITAMIN D3) 25 mcg (1,000 units) tablet, Take 1,000 Units by mouth, Disp: , Rfl:     cyanocobalamin (VITAMIN B-12) 100 mcg tablet, Take by mouth daily, Disp: , Rfl:     cyanocobalamin (VITAMIN B-12) 500 MCG tablet, Take 1 tablet by mouth daily, Disp: , Rfl:     finasteride (PROSCAR) 5 mg tablet, Take 5 mg by mouth daily, Disp: , Rfl:     folic acid (FOLVITE) 668 mcg tablet, Take 400 mcg by mouth daily, Disp: , Rfl:     furosemide (LASIX) 40 mg tablet, TAKE 1 TABLET BY MOUTH EVERY DAY, Disp: , Rfl:     lisinopril (ZESTRIL) 20 mg tablet, Take 20 mg by mouth every evening, Disp: , Rfl:     Multiple Vitamins-Minerals (MULTIVITAMINS/MINERALS ADULT PO), daily, Disp: , Rfl:     naproxen (NAPROSYN) 500 mg tablet, Take 1 tablet by mouth 2 (two) times a day with meals for 7 days, Disp: 14 tablet, Rfl: 0    nitroglycerin (NITROSTAT) 0 4 mg SL tablet, Place 0 4 mg under the tongue, Disp: , Rfl:     polyethylene glycol-propylene glycol (HM LUBRICATING TEARS) 0 4-0 3 %, 1 drop, Disp: , Rfl:     pravastatin (PRAVACHOL) 10 mg tablet, Take 1 tablet by mouth, Disp: , Rfl:     SUPREP BOWEL PREP KIT 17 5-3 13-1 6 GM/177ML SOLN, Take 180 mL by mouth once for 1 dose, Disp: 180 mL, Rfl: 0    tamsulosin (FLOMAX) 0 4 mg, Take 0 4 mg by mouth daily with dinner, Disp: , Rfl:     TOBRADEX ophthalmic ointment, APPLY SMALL AMOUNT 3 TIMES EVERY DAY INTO THE CONJUNCTIVAL SAC(S) IN AFFECTED EYE(S), Disp: , Rfl: 1    triamcinolone (KENALOG) 0 1 % cream, Apply 1 application topically, Disp: , Rfl:     vitamin E, tocopherol, 1,000 units capsule, Take 1,000 Units by mouth daily, Disp: , Rfl:     vitamin E, tocopherol, 1,000 units capsule, Take 1,000 Units by mouth, Disp: , Rfl:     warfarin (COUMADIN) 5 mg tablet, Take 5 mg by mouth daily Tues  thurs,sat (Patient not taking: Reported on 5/5/2022 ), Disp: , Rfl:     Warfarin Sodium (COUMADIN PO), Take 7 5 mg by mouth Mon,wed,fri,sun (Patient not taking: Reported on 5/5/2022 ), Disp: , Rfl:     Current Allergies     Allergies as of 05/11/2022 - Reviewed 05/11/2022   Allergen Reaction Noted    Ezetimibe Other (See Comments) 09/03/2019    Simvastatin Other (See Comments) 09/03/2019            The following portions of the patient's history were reviewed and updated as appropriate: allergies, current medications, past family history, past medical history, past social history, past surgical history and problem list      Past Medical History:   Diagnosis Date    Cardiac disease     Colon polyp     DVT [facility]deep venous thrombosis) ([facility] [address] )     Enlarged prostate     Hyperlipidemia     Hypertension     Myocardial infarction [facility])        Past Surgical History:   Procedure Laterality Date    CARDIAC PACEMAKER PLACEMENT      CARDIAC SURGERY      CABG x 5    COLONOSCOPY      CYST REMOVAL Left     breast    EGD         History reviewed  No pertinent family history  Medications have been verified  Objective   /78   Pulse 69   Temp 98 °F (36 7 °C)   Resp 18   Ht 5' 7" (1 702 m)   Wt 102 kg (225 lb)   SpO2 96%   BMI 35 24 kg/m²   No LMP for male patient  Physical Exam     Physical Exam  Vitals reviewed  Constitutional:       General: He is not in acute distress  Appearance: He is well-developed  He is not diaphoretic  HENT:      Head: Normocephalic  Right Ear: Tympanic membrane and external ear normal       Left Ear: Tympanic membrane and external ear normal       Nose: Nose normal       Mouth/Throat:      Pharynx: No oropharyngeal exudate or posterior oropharyngeal erythema  Eyes:      Conjunctiva/sclera: Conjunctivae normal    Cardiovascular:      Rate and Rhythm: Normal rate and regular rhythm  Pulses: Normal pulses  Heart sounds: Normal heart sounds  No murmur heard  No friction rub  No gallop  Pulmonary:      Effort: Pulmonary effort is normal  No respiratory distress  Breath sounds: Normal breath sounds  No wheezing or rales  Chest:      Chest wall: No tenderness  Musculoskeletal:         General: No swelling or tenderness  Comments: Decreased L shoulder ROM to 15 degrees secondary to pain  Lymphadenopathy:      Cervical: No cervical adenopathy  Skin:     General: Skin is warm  Capillary Refill: Capillary refill takes less than 2 seconds  Neurological:      Mental Status: He is alert and oriented to person, place, and time  Sensory: No sensory deficit  Psychiatric:         Behavior: Behavior normal          Thought Content:  Thought content normal          Judgment: Judgment normal

## 2022-05-11 NOTE — PATIENT INSTRUCTIONS
Cough may linger for 4-6 weeks  Monitor for new/worsening shortness of breath, fevers, worsening cough or new chest discomfort  Tylenol as needed for pain  Do not immobilize shoulder  Ice over affected area 15 minutes at least 4 times per day  Insulate area to prevent frostbite  Follow up with ortho specialist  Follow up with PCP in 3-5 days  Proceed to  ER if symptoms worsen

## 2024-10-22 ENCOUNTER — TELEPHONE (OUTPATIENT)
Dept: GASTROENTEROLOGY | Facility: CLINIC | Age: 84
End: 2024-10-22

## 2024-10-22 NOTE — TELEPHONE ENCOUNTER
I called the patient in regards to recall for colonoscopy. Unable to lvm for the patient to schedule appt prior to colonoscopy.